# Patient Record
Sex: FEMALE | Race: WHITE | URBAN - METROPOLITAN AREA
[De-identification: names, ages, dates, MRNs, and addresses within clinical notes are randomized per-mention and may not be internally consistent; named-entity substitution may affect disease eponyms.]

---

## 2021-01-13 ENCOUNTER — OFFICE VISIT (OUTPATIENT)
Dept: URBAN - METROPOLITAN AREA CLINIC 55 | Facility: CLINIC | Age: 61
End: 2021-01-13
Payer: COMMERCIAL

## 2021-01-13 DIAGNOSIS — C95.90 LEUKEMIA: ICD-10-CM

## 2021-01-13 DIAGNOSIS — H43.12 VITREOUS HEMORRHAGE, LEFT EYE: ICD-10-CM

## 2021-01-13 PROCEDURE — 99213 OFFICE O/P EST LOW 20 MIN: CPT | Performed by: OPHTHALMOLOGY

## 2021-01-13 PROCEDURE — 92134 CPTRZ OPH DX IMG PST SGM RTA: CPT | Performed by: OPHTHALMOLOGY

## 2021-01-13 ASSESSMENT — INTRAOCULAR PRESSURE
OS: 15
OD: 17

## 2021-01-13 NOTE — IMPRESSION/PLAN
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 OS. 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 OCT reveals a thinned/atrophic retina. Likely etiology of poor VA OS. Comfort care.

## 2021-01-13 NOTE — IMPRESSION/PLAN
Impression: Age-related nuclear cataract, bilateral: H25.13. Plan: Non-visually signifcant. Observe.

## 2021-01-13 NOTE — IMPRESSION/PLAN
Impression: Vitreous hemorrhage, left eye: H43.12. OS. Plan: Resolving. Observe Return in 12 months DFE OU OCT OU

## 2021-01-13 NOTE — IMPRESSION/PLAN
Impression: Puckering of macula, left eye: H35.372. OCT:
OD: WNL
OS: ERM, macular atrophy Plan: There is an epiretinal membrane (ERM), but the patient is doing well with their current vision, and thus we will observe the ERM for now. OCT shows no IRF/SRF OD and ERM with atrophic retina OS. We did discuss the natural history as well as the risks and benefits of observation vs. vitrectomy surgery. The patient will call if they experience decreased vision or increased metamorphopsia.

## 2022-02-02 ENCOUNTER — OFFICE VISIT (OUTPATIENT)
Dept: URBAN - METROPOLITAN AREA CLINIC 55 | Facility: CLINIC | Age: 62
End: 2022-02-02
Payer: COMMERCIAL

## 2022-02-02 DIAGNOSIS — H25.13 AGE-RELATED NUCLEAR CATARACT, BILATERAL: ICD-10-CM

## 2022-02-02 DIAGNOSIS — H47.092 OTHER DISORDER OF OPTIC NERVE OF LEFT EYE: ICD-10-CM

## 2022-02-02 DIAGNOSIS — H35.372 PUCKERING OF MACULA, LEFT EYE: Primary | ICD-10-CM

## 2022-02-02 PROCEDURE — 99213 OFFICE O/P EST LOW 20 MIN: CPT | Performed by: OPHTHALMOLOGY

## 2022-02-02 PROCEDURE — 92134 CPTRZ OPH DX IMG PST SGM RTA: CPT | Performed by: OPHTHALMOLOGY

## 2022-02-02 ASSESSMENT — INTRAOCULAR PRESSURE
OS: 17
OD: 16

## 2022-02-02 NOTE — IMPRESSION/PLAN
Impression: Vitreous hemorrhage, left eye: H43.12. OS. Plan: Resolved Observe Return in 12 months DFE OU OCT OU

## 2022-02-02 NOTE — IMPRESSION/PLAN
Impression: Puckering of macula, left eye: H35.372. OCT: 02/02/22 OD: WNL
OS: ERM, macular atrophy Plan: There is an epiretinal membrane (ERM), but the patient is doing well with their current vision, and thus we will observe the ERM for now. OCT shows no IRF/SRF OD and ERM with atrophic retina OS. We did discuss the natural history as well as the risks and benefits of observation vs. vitrectomy surgery. The patient will call if they experience decreased vision or increased metamorphopsia.

## 2023-02-01 ENCOUNTER — OFFICE VISIT (OUTPATIENT)
Dept: URBAN - METROPOLITAN AREA CLINIC 55 | Facility: CLINIC | Age: 63
End: 2023-02-01
Payer: COMMERCIAL

## 2023-02-01 DIAGNOSIS — H47.092 OTHER DISORDER OF OPTIC NERVE OF LEFT EYE: ICD-10-CM

## 2023-02-01 DIAGNOSIS — H35.372 PUCKERING OF MACULA, LEFT EYE: Primary | ICD-10-CM

## 2023-02-01 DIAGNOSIS — C95.90 LEUKEMIA: ICD-10-CM

## 2023-02-01 DIAGNOSIS — H43.12 VITREOUS HEMORRHAGE, LEFT EYE: ICD-10-CM

## 2023-02-01 DIAGNOSIS — H25.13 AGE-RELATED NUCLEAR CATARACT, BILATERAL: ICD-10-CM

## 2023-02-01 PROCEDURE — 99213 OFFICE O/P EST LOW 20 MIN: CPT | Performed by: OPHTHALMOLOGY

## 2023-02-01 PROCEDURE — 92134 CPTRZ OPH DX IMG PST SGM RTA: CPT | Performed by: OPHTHALMOLOGY

## 2023-02-01 ASSESSMENT — INTRAOCULAR PRESSURE
OD: 15
OS: 15

## 2023-02-01 NOTE — IMPRESSION/PLAN
Impression: Puckering of macula, left eye: H35.372. OCT: 02/01/2023 OD: WNL
OS: ERM, macular atrophy Plan: There is an epiretinal membrane (ERM), but the patient is doing well with their current vision, and thus we will observe the ERM for now. OCT shows no IRF/SRF OD and ERM with atrophic retina OS. We did discuss the natural history as well as the risks and benefits of observation vs. vitrectomy surgery. The patient will call if they experience decreased vision or increased metamorphopsia.